# Patient Record
Sex: MALE | Race: WHITE | NOT HISPANIC OR LATINO | Employment: STUDENT | URBAN - NONMETROPOLITAN AREA
[De-identification: names, ages, dates, MRNs, and addresses within clinical notes are randomized per-mention and may not be internally consistent; named-entity substitution may affect disease eponyms.]

---
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
## 2024-05-18 ENCOUNTER — HOSPITAL ENCOUNTER (EMERGENCY)
Facility: HOSPITAL | Age: 19
Discharge: HOME | End: 2024-05-18
Attending: EMERGENCY MEDICINE

## 2024-05-18 ENCOUNTER — APPOINTMENT (OUTPATIENT)
Dept: GENERAL RADIOLOGY | Facility: HOSPITAL | Age: 19
End: 2024-05-18
Attending: EMERGENCY MEDICINE

## 2024-05-18 VITALS
DIASTOLIC BLOOD PRESSURE: 75 MMHG | RESPIRATION RATE: 20 BRPM | HEIGHT: 70 IN | BODY MASS INDEX: 45.1 KG/M2 | SYSTOLIC BLOOD PRESSURE: 122 MMHG | WEIGHT: 315 LBS | HEART RATE: 100 BPM | OXYGEN SATURATION: 96 % | TEMPERATURE: 98 F

## 2024-05-18 DIAGNOSIS — S96.912A MUSCLE STRAIN OF LEFT FOOT, INITIAL ENCOUNTER: Primary | ICD-10-CM

## 2024-05-18 PROCEDURE — 99283 EMERGENCY DEPT VISIT LOW MDM: CPT

## 2024-05-18 PROCEDURE — 73630 X-RAY EXAM OF FOOT: CPT | Performed by: EMERGENCY MEDICINE

## 2024-05-18 PROCEDURE — 99284 EMERGENCY DEPT VISIT MOD MDM: CPT

## 2024-05-18 NOTE — ED PROVIDER NOTES
Patient Seen in: [facility] Emergency Department      History     Chief Complaint   Patient presents with    Foot Injury     Stated Complaint: L foot pain    Subjective:   HPI    18 yoM presents for evaluation of left foot pain.  About 3 weeks ago he believes he inverted his foot and ankle while at work.  He has been ambulatory since then.  After playing air soft 6 days ago he had recurrent pain.  He has been ambulatory since then.  Pain is located at the top of the left foot.  No loss of strength or sensation.  He did break a bone in his foot in childhood which was managed nonoperatively.    Objective:   History reviewed. No pertinent past medical history.           No pertinent past surgical history.              No pertinent social history.            Review of Systems    Positive for stated complaint: L foot pain  Other systems are as noted in HPI.  Constitutional and vital signs reviewed.      All other systems reviewed and negative except as noted above.    Physical Exam     ED Triage Vitals [05/18/24 1553]   /78   Pulse 99   Resp 20   Temp 98.1 °F (36.7 °C)   Temp src Oral   SpO2 97 %   O2 Device None (Room air)       Current Vitals:   Vital Signs  BP: 122/75  Pulse: 100  Resp: 20  Temp: 98.1 °F (36.7 °C)  Temp src: Oral    Oxygen Therapy  SpO2: 96 %  O2 Device: None (Room air)            Physical Exam  Vitals and nursing note reviewed.   Constitutional:       Appearance: He is well-developed.   HENT:      Head: Normocephalic and atraumatic.   Eyes:      Extraocular Movements: Extraocular movements intact.   Cardiovascular:      Rate and Rhythm: Normal rate and regular rhythm.      Comments: Left DP and PT pulses 2+  Pulmonary:      Effort: Pulmonary effort is normal.   Abdominal:      Palpations: Abdomen is soft.   Musculoskeletal:         General: Normal range of motion.      Cervical back: Normal range of motion.      Comments: Full active range of motion of the left foot, ankle and knee.   Nontender at the proximal fibula.  Nontender at the ankle.  Mild tenderness to palpation at the dorsal midfoot without open wound or laceration, no ecchymosis or erythema   Skin:     General: Skin is warm.   Neurological:      Mental Status: He is alert.      Comments: No focal deficits         Differential diagnosis includes but is not limited to sprain, fracture    ED Course   Labs Reviewed - No data to display          XR FOOT, COMPLETE (MIN 3 VIEWS), LEFT (CPT=73630)    Result Date: 5/18/2024  CONCLUSION:  1. There is no acute fracture or dislocation. 2. Unusual medial deviation of the 2nd through 4th metatarsals, which could be developmental in nature or represent chronic sequela of remote trauma.    Dictated by (CST): Collin Diaz MD on 5/18/2024 at 5:43 PM     Finalized by (CST): Collin Diaz MD on 5/18/2024 at 5:44 PM                  MDM                Medical Decision Making  Patient is well-appearing and ambulatory without evidence of neurovascular compromise.  Per my independent interpretation of left foot x-ray no acute fracture.  Discussed rest, ibuprofen, Tylenol and good arch support.    Amount and/or Complexity of Data Reviewed  Radiology: ordered and independent interpretation performed. Decision-making details documented in ED Course.    Risk  OTC drugs.        Disposition and Plan     Clinical Impression:  1. Muscle strain of left foot, initial encounter         Disposition:  Discharge  5/18/2024  5:54 pm    Follow-up:  Samy Kumari MD  [address] [address]  [phone]    Follow up  Primary care doctor you can call to establish care          Medications Prescribed:  There are no discharge medications for this patient.

## 2024-05-18 NOTE — ED INITIAL ASSESSMENT (HPI)
Pt came in for left foot injury after playing airsoft happened Sunday.  Per pt he also had the same injury 3 weeks ago at work,  Pt is A/Ox  4, breathing unlabored.

## (undated) NOTE — LETTER
Date & Time: 5/18/2024, 5:54 PM  Patient: Britton Baptiste  Encounter Provider(s):    Sarina Lei MD       To Whom It May Concern:    Britton Baptiste was seen and treated in our department on 5/18/2024. He can return to work after 3 days.    If you have any questions or concerns, please do not hesitate to call.        _____________________________  Physician/APC Signature